# Patient Record
Sex: FEMALE | Race: WHITE | HISPANIC OR LATINO | URBAN - METROPOLITAN AREA
[De-identification: names, ages, dates, MRNs, and addresses within clinical notes are randomized per-mention and may not be internally consistent; named-entity substitution may affect disease eponyms.]

---

## 2020-06-15 ENCOUNTER — IMPORTED ENCOUNTER (OUTPATIENT)
Dept: URBAN - METROPOLITAN AREA CLINIC 50 | Facility: CLINIC | Age: 55
End: 2020-06-15

## 2020-06-18 ENCOUNTER — IMPORTED ENCOUNTER (OUTPATIENT)
Dept: URBAN - METROPOLITAN AREA CLINIC 50 | Facility: CLINIC | Age: 55
End: 2020-06-18

## 2020-06-23 ENCOUNTER — IMPORTED ENCOUNTER (OUTPATIENT)
Dept: URBAN - METROPOLITAN AREA CLINIC 50 | Facility: CLINIC | Age: 55
End: 2020-06-23

## 2020-07-20 ENCOUNTER — IMPORTED ENCOUNTER (OUTPATIENT)
Dept: URBAN - METROPOLITAN AREA CLINIC 50 | Facility: CLINIC | Age: 55
End: 2020-07-20

## 2020-07-21 ENCOUNTER — IMPORTED ENCOUNTER (OUTPATIENT)
Dept: URBAN - METROPOLITAN AREA CLINIC 50 | Facility: CLINIC | Age: 55
End: 2020-07-21

## 2020-08-07 ENCOUNTER — IMPORTED ENCOUNTER (OUTPATIENT)
Dept: URBAN - METROPOLITAN AREA CLINIC 50 | Facility: CLINIC | Age: 55
End: 2020-08-07

## 2020-11-24 ENCOUNTER — IMPORTED ENCOUNTER (OUTPATIENT)
Dept: URBAN - METROPOLITAN AREA CLINIC 50 | Facility: CLINIC | Age: 55
End: 2020-11-24

## 2021-04-12 ENCOUNTER — IMPORTED ENCOUNTER (OUTPATIENT)
Dept: URBAN - METROPOLITAN AREA CLINIC 50 | Facility: CLINIC | Age: 56
End: 2021-04-12

## 2021-04-13 ENCOUNTER — IMPORTED ENCOUNTER (OUTPATIENT)
Dept: URBAN - METROPOLITAN AREA CLINIC 50 | Facility: CLINIC | Age: 56
End: 2021-04-13

## 2021-04-13 NOTE — PATIENT DISCUSSION
"""Has had a B scan OU with Dr Tequila Chaudhary. BDD OU. Denies HAs. No new symptoms.  Never has ""

## 2021-04-17 ASSESSMENT — VISUAL ACUITY
OS_CC: 20/40
OS_PH: 20/30
OD_CC: 20/25-2
OD_CC: 20/25
OS_CC: 20/60
OS_CC: 20/25-1
OD_CC: J1+
OS_CC: J1+
OS_CC: 20/25
OD_BAT: 20/20
OD_CC: J1+@ 16 IN
OD_CC: 20/50
OD_OTHER: 20/20. 20/30.
OS_OTHER: 20/25. 20/50.
OS_CC: 20/25
OD_PH: 20/30-
OD_CC: 20/25
OS_BAT: 20/25
OD_CC: 20/25-1
OS_CC: J1+@ 16 IN

## 2021-04-17 ASSESSMENT — TONOMETRY
OS_IOP_MMHG: 13
OD_IOP_MMHG: 14
OS_IOP_MMHG: 13
OD_IOP_MMHG: 14
OS_IOP_MMHG: 14
OS_IOP_MMHG: 14
OD_IOP_MMHG: 15
OD_IOP_MMHG: 12
OD_IOP_MMHG: 14
OS_IOP_MMHG: 14

## 2021-05-25 ENCOUNTER — PREPPED CHART (OUTPATIENT)
Dept: URBAN - METROPOLITAN AREA CLINIC 52 | Facility: CLINIC | Age: 56
End: 2021-05-25

## 2021-05-25 NOTE — PATIENT DISCUSSION
"""Has had a B scan OU with Dr Sherrill Quintero. BDD OU. Denies HAs. No new symptoms.  Never has ""

## 2021-05-27 ENCOUNTER — 1 MONTH FOLLOW-UP (OUTPATIENT)
Dept: URBAN - METROPOLITAN AREA CLINIC 52 | Facility: CLINIC | Age: 56
End: 2021-05-27

## 2021-05-27 DIAGNOSIS — H43.812: ICD-10-CM

## 2021-05-27 DIAGNOSIS — H47.322: ICD-10-CM

## 2021-05-27 DIAGNOSIS — H47.332: ICD-10-CM

## 2021-05-27 PROCEDURE — 92014 COMPRE OPH EXAM EST PT 1/>: CPT

## 2021-05-27 ASSESSMENT — VISUAL ACUITY
OS_CC: 20/40
OU_CC: J1+
OD_CC: 20/50
OD_PH: 20/30

## 2021-05-27 ASSESSMENT — TONOMETRY
OS_IOP_MMHG: 15
OD_IOP_MMHG: 12

## 2021-09-17 ENCOUNTER — DIAGNOSTICS - HVF (OUTPATIENT)
Dept: URBAN - METROPOLITAN AREA CLINIC 50 | Facility: CLINIC | Age: 56
End: 2021-09-17

## 2021-09-17 DIAGNOSIS — H47.332: ICD-10-CM

## 2021-09-17 DIAGNOSIS — H47.331: ICD-10-CM

## 2021-09-17 PROCEDURE — 92083 EXTENDED VISUAL FIELD XM: CPT

## 2021-09-28 ENCOUNTER — 6 MONTH COMPREHENSIVE EXAM (OUTPATIENT)
Dept: URBAN - METROPOLITAN AREA CLINIC 50 | Facility: CLINIC | Age: 56
End: 2021-09-28

## 2021-09-28 DIAGNOSIS — H47.331: ICD-10-CM

## 2021-09-28 DIAGNOSIS — H43.812: ICD-10-CM

## 2021-09-28 PROCEDURE — 92014 COMPRE OPH EXAM EST PT 1/>: CPT

## 2021-09-28 ASSESSMENT — TONOMETRY
OS_IOP_MMHG: 15
OD_IOP_MMHG: 15

## 2021-09-28 ASSESSMENT — VISUAL ACUITY
OS_CC: 20/25
OU_CC: 20/20
OU_CC: J1+
OD_CC: 20/20

## 2022-03-29 ENCOUNTER — COMPREHENSIVE EXAM (OUTPATIENT)
Dept: URBAN - METROPOLITAN AREA CLINIC 50 | Facility: CLINIC | Age: 57
End: 2022-03-29

## 2022-03-29 DIAGNOSIS — H47.321: ICD-10-CM

## 2022-03-29 DIAGNOSIS — H47.322: ICD-10-CM

## 2022-03-29 DIAGNOSIS — Z01.01: ICD-10-CM

## 2022-03-29 DIAGNOSIS — H25.13: ICD-10-CM

## 2022-03-29 DIAGNOSIS — H47.332: ICD-10-CM

## 2022-03-29 DIAGNOSIS — H43.812: ICD-10-CM

## 2022-03-29 PROCEDURE — 92015 DETERMINE REFRACTIVE STATE: CPT

## 2022-03-29 PROCEDURE — 92014 COMPRE OPH EXAM EST PT 1/>: CPT

## 2022-03-29 ASSESSMENT — VISUAL ACUITY
OD_CC: 20/20-1
OU_CC: J1+
OS_CC: 20/20-1
OU_CC: 20/15-2

## 2022-03-29 ASSESSMENT — TONOMETRY
OD_IOP_MMHG: 15
OS_IOP_MMHG: 15

## 2023-05-09 ENCOUNTER — COMPREHENSIVE EXAM (OUTPATIENT)
Dept: URBAN - METROPOLITAN AREA CLINIC 50 | Facility: CLINIC | Age: 58
End: 2023-05-09

## 2023-05-09 DIAGNOSIS — Z01.01: ICD-10-CM

## 2023-05-09 DIAGNOSIS — H52.13: ICD-10-CM

## 2023-05-09 PROCEDURE — 92014 COMPRE OPH EXAM EST PT 1/>: CPT

## 2023-05-09 PROCEDURE — 92015 DETERMINE REFRACTIVE STATE: CPT

## 2023-05-09 ASSESSMENT — VISUAL ACUITY
OD_CC: 20/25+2
OS_CC: 20/25
OS_GLARE: 20/30
OU_CC: 20/20
OD_GLARE: 20/40
OS_GLARE: 20/40+2
OU_CC: J1+ @ 17"
OD_GLARE: 20/30

## 2023-05-09 ASSESSMENT — KERATOMETRY
OD_K1POWER_DIOPTERS: 43.25
OD_K2POWER_DIOPTERS: 43.50
OD_AXISANGLE2_DEGREES: 45
OS_K2POWER_DIOPTERS: 43.50
OS_AXISANGLE2_DEGREES: 119
OS_AXISANGLE_DEGREES: 29
OD_AXISANGLE_DEGREES: 135
OS_K1POWER_DIOPTERS: 42.50

## 2023-05-09 ASSESSMENT — TONOMETRY
OS_IOP_MMHG: 15
OD_IOP_MMHG: 15

## 2023-06-12 ENCOUNTER — EMERGENCY VISIT (OUTPATIENT)
Dept: URBAN - METROPOLITAN AREA CLINIC 52 | Facility: CLINIC | Age: 58
End: 2023-06-12

## 2023-06-12 DIAGNOSIS — H47.333: ICD-10-CM

## 2023-06-12 DIAGNOSIS — H02.61: ICD-10-CM

## 2023-06-12 DIAGNOSIS — H43.813: ICD-10-CM

## 2023-06-12 PROCEDURE — 92134 CPTRZ OPH DX IMG PST SGM RTA: CPT

## 2023-06-12 PROCEDURE — 92014 COMPRE OPH EXAM EST PT 1/>: CPT

## 2023-06-12 ASSESSMENT — KERATOMETRY
OS_AXISANGLE2_DEGREES: 119
OD_AXISANGLE2_DEGREES: 45
OD_K2POWER_DIOPTERS: 43.50
OS_K1POWER_DIOPTERS: 42.50
OS_K2POWER_DIOPTERS: 43.50
OS_AXISANGLE_DEGREES: 29
OD_K1POWER_DIOPTERS: 43.25
OD_AXISANGLE_DEGREES: 135

## 2023-06-12 ASSESSMENT — VISUAL ACUITY
OS_CC: 20/20
OU_CC: 20/20
OD_CC: 20/20

## 2023-06-12 ASSESSMENT — TONOMETRY
OD_IOP_MMHG: 11
OS_IOP_MMHG: 12

## 2024-05-07 ENCOUNTER — COMPREHENSIVE EXAM (OUTPATIENT)
Dept: URBAN - METROPOLITAN AREA CLINIC 50 | Facility: LOCATION | Age: 59
End: 2024-05-07

## 2024-05-07 DIAGNOSIS — H25.13: ICD-10-CM

## 2024-05-07 DIAGNOSIS — H47.333: ICD-10-CM

## 2024-05-07 DIAGNOSIS — H47.323: ICD-10-CM

## 2024-05-07 PROCEDURE — 92015 DETERMINE REFRACTIVE STATE: CPT

## 2024-05-07 PROCEDURE — 99214 OFFICE O/P EST MOD 30 MIN: CPT

## 2024-05-07 ASSESSMENT — KERATOMETRY
OS_AXISANGLE_DEGREES: 37
OS_K1POWER_DIOPTERS: 42.50
OD_AXISANGLE_DEGREES: 143
OD_K2POWER_DIOPTERS: 43.75
OD_AXISANGLE2_DEGREES: 53
OS_K2POWER_DIOPTERS: 43.00
OD_K1POWER_DIOPTERS: 43.25
OS_AXISANGLE2_DEGREES: 127

## 2024-05-07 ASSESSMENT — VISUAL ACUITY
OD_CC: 20/25+2
OS_GLARE: 20/100
OS_CC: 20/25
OD_GLARE: 20/80
OU_CC: J1+
OD_GLARE: 20/60
OU_CC: 20/20-2
OS_GLARE: 20/70

## 2024-05-07 ASSESSMENT — TONOMETRY
OS_IOP_MMHG: 14
OD_IOP_MMHG: 14

## 2025-05-13 ENCOUNTER — CONSULTATION/EVALUATION (OUTPATIENT)
Age: 60
End: 2025-05-13

## 2025-05-13 DIAGNOSIS — H25.13: ICD-10-CM

## 2025-05-13 DIAGNOSIS — H52.4: ICD-10-CM

## 2025-05-13 DIAGNOSIS — H47.323: ICD-10-CM

## 2025-05-13 DIAGNOSIS — H43.813: ICD-10-CM

## 2025-05-13 DIAGNOSIS — H47.333: ICD-10-CM

## 2025-05-13 DIAGNOSIS — Z01.01: ICD-10-CM

## 2025-05-13 PROCEDURE — 92014 COMPRE OPH EXAM EST PT 1/>: CPT

## 2025-05-13 PROCEDURE — 92015 DETERMINE REFRACTIVE STATE: CPT
